# Patient Record
Sex: FEMALE
[De-identification: names, ages, dates, MRNs, and addresses within clinical notes are randomized per-mention and may not be internally consistent; named-entity substitution may affect disease eponyms.]

---

## 2020-02-12 ENCOUNTER — HOSPITAL ENCOUNTER (EMERGENCY)
Dept: HOSPITAL 97 - ER | Age: 35
Discharge: HOME | End: 2020-02-12
Payer: SELF-PAY

## 2020-02-12 DIAGNOSIS — E11.65: Primary | ICD-10-CM

## 2020-02-12 DIAGNOSIS — F43.8: ICD-10-CM

## 2020-02-12 LAB
BUN BLD-MCNC: 10 MG/DL (ref 7–18)
GLUCOSE SERPLBLD-MCNC: 254 MG/DL (ref 74–106)
HCT VFR BLD CALC: 38.9 % (ref 36–45)
LYMPHOCYTES # SPEC AUTO: 3.2 K/UL (ref 0.7–4.9)
PMV BLD: 10.3 FL (ref 7.6–11.3)
POTASSIUM SERPL-SCNC: 3.7 MMOL/L (ref 3.5–5.1)
RBC # BLD: 4.59 M/UL (ref 3.86–4.86)

## 2020-02-12 PROCEDURE — 96372 THER/PROPH/DIAG INJ SC/IM: CPT

## 2020-02-12 PROCEDURE — 85025 COMPLETE CBC W/AUTO DIFF WBC: CPT

## 2020-02-12 PROCEDURE — 96361 HYDRATE IV INFUSION ADD-ON: CPT

## 2020-02-12 PROCEDURE — 96375 TX/PRO/DX INJ NEW DRUG ADDON: CPT

## 2020-02-12 PROCEDURE — 36415 COLL VENOUS BLD VENIPUNCTURE: CPT

## 2020-02-12 PROCEDURE — 81025 URINE PREGNANCY TEST: CPT

## 2020-02-12 PROCEDURE — 81003 URINALYSIS AUTO W/O SCOPE: CPT

## 2020-02-12 PROCEDURE — 82947 ASSAY GLUCOSE BLOOD QUANT: CPT

## 2020-02-12 PROCEDURE — 99284 EMERGENCY DEPT VISIT MOD MDM: CPT

## 2020-02-12 PROCEDURE — 80048 BASIC METABOLIC PNL TOTAL CA: CPT

## 2020-02-12 PROCEDURE — 96374 THER/PROPH/DIAG INJ IV PUSH: CPT

## 2020-02-12 NOTE — ER
Nurse's Notes                                                                                     

 Formerly [facility]                                                                 

Name: Tori Zaman                                                                                

Age: 34 yrs                                                                                       

Sex: Female                                                                                       

: 1985                                                                                   

MRN: [id number]                                                                                   

Arrival Date: 2020                                                                          

Time: 17:10                                                                                       

Account#: [id number]                                                                            

Bed 7                                                                                             

Private MD:                                                                                       

Diagnosis: Other reactions to severe stress;Hyperglycemia, unspecified                            

                                                                                                  

Presentation:                                                                                     

                                                                                             

17:10 Presenting complaint: EMS states: HYPERGLYCEMIA WITH NAUSEA AND DIZZINESS. Transition   bp  

      of care: patient was not received from another setting of care. Onset of symptoms was       

      2020. Risk Assessment: Do you want to hurt yourself or someone else?           

      Patient reports no desire to harm self or others. Initial Sepsis Screen: Does the           

      patient meet any 2 criteria? No. Patient's initial sepsis screen is negative. Does the      

      patient have a suspected source of infection? No. Patient's initial sepsis screen is        

      negative. Care prior to arrival: Medication(s) given: Normal saline infusion, 1000 mL,      

      IV initiated. 20 GA, in the left antecubital area, Glucose check: 308.                      

17:10 Method Of Arrival: EMS: [facility]                                                         bp  

17:10 Acuity: TABITHA 3                                                                           bp  

                                                                                                  

Triage Assessment:                                                                                

17:11 General: Appears in no apparent distress. comfortable, obese, Behavior is calm,         bp  

      cooperative, appropriate for age. Pain: Denies pain. EENT: No deficits noted. Neuro: No     

      deficits noted. Cardiovascular: Rhythm is sinus rhythm. Respiratory: No deficits noted.     

      GI: Reports nausea. : No signs and/or symptoms were reported regarding the                

      genitourinary system. Derm: No deficits noted. Musculoskeletal: No deficits noted.          

                                                                                                  

OB/GYN:                                                                                           

17:11 LMP 2020                                                                           bp  

                                                                                                  

Historical:                                                                                       

- Allergies:                                                                                      

17:11 No Known Allergies;                                                                     bp  

- Home Meds:                                                                                      

17:11 Metformin Oral [Active];                                                                bp  

- PMHx:                                                                                           

17:11 Diabetes - NIDDM;                                                                       bp  

                                                                                                  

- Immunization history:: Adult Immunizations unknown.                                             

- Coronavirus screen:: The patient has NOT traveled to [place] in the past 14 days.                 

  Proceed with normal triage process as indicated. The patient has NOT had contact with           

  known/suspected case of Coronavirus? Proceed with normal triage procedures.                     

- Social history:: Smoking status: Patient denies any tobacco usage or history of.                

- Ebola Screening: : No symptoms or risks identified at this time.                                

                                                                                                  

                                                                                                  

Screenin:13 Abuse screen: Denies threats or abuse. Denies injuries from another. Nutritional        bp  

      screening: No deficits noted. Tuberculosis screening: No symptoms or risk factors           

      identified. Fall Risk None identified.                                                      

                                                                                                  

Assessment:                                                                                       

17:13 General: SEE TRIAGE NOTE.                                                               bp  

18:15 Reassessment: Patient appears in no apparent distress at this time. Patient and/or      hb  

      family updated on plan of care and expected duration. Pain level reassessed. Patient is     

      alert, oriented x 3, equal unlabored respirations, skin warm/dry/pink.                      

19:21 Reassessment: Patient appears in no apparent distress at this time. ROSETTA Dixon talked   rv  

      to the patient and the relatives at the bedside regarding results of diagnostics and        

      the plan of care. patient agreed. patient is for discharge after the infusion of the NS     

      bolus.l.                                                                                    

20:30 Reassessment: Patient appears in no apparent distress at this time. Patient and/or      rv  

      family updated on plan of care and expected duration. Pain level reassessed. Patient is     

      alert, oriented x 3, equal unlabored respirations, skin warm/dry/pink. patient felt         

      comfortable and better after medications and fluid administration. discharged with          

      family member, ambulatory. instructions given and explained.                                

                                                                                                  

Vital Signs:                                                                                      

17:11  / 88; Pulse 89; Resp 17; Temp 97.8; Pulse Ox 100% ; Weight 90.72 kg; Height 5    bp  

      ft. 2 in. (157.48 cm);                                                                      

18:19  / 91; Pulse 87; Resp 16; Pulse Ox 100% on R/A;                                   hb  

19:22  / 76; Pulse 91; Resp 17; Pulse Ox 100% on R/A;                                   rv  

17:11 Body Mass Index 36.58 (90.72 kg, 157.48 cm)                                             bp  

                                                                                                  

ED Course:                                                                                        

17:10 Patient arrived in ED.                                                                  bp  

17:11 Triage completed.                                                                       bp  

17:11 Arm band placed on.                                                                     bp  

17:13 Patient has correct armband on for positive identification. Bed in low position. Call   bp  

      light in reach. Side rails up X2.                                                           

17:13 Maintain EMS IV. Dressing intact. Good blood return noted. Site clean \T\ dry. Gauge \T\    bp

      site: 20 GAUGE LEFT AC.                                                                     

17:14 [facility]                                                        snw 

17:14 Raza Nunes MD is Attending Physician.                                              snw 

17:21 Brennon Mancia, RN is Primary Nurse.                                                    bp  

19:14 Primary Nurse role handed off by Brennon Mancia, RN                                     hb  

19:14 Maranda Read, RN is Primary Nurse.                                                   hb  

19:45 Inserted saline lock: 22 gauge in right hand, using aseptic technique.                  rv  

20:31 No provider procedures requiring assistance completed. IV discontinued, intact,         rv  

      bleeding controlled, No redness/swelling at site. Pressure dressing applied.                

                                                                                                  

Administered Medications:                                                                         

17:32 Drug: Ativan 1 mg Route: IVP; Site: left antecubital;                                   hb  

20:30 Follow up: Response: No adverse reaction                                                rv  

17:32 Drug: NS 0.9% 1000 ml Route: IV; Rate: 1 bolus; Site: left antecubital;                 hb  

20:30 Follow up: IV Status: Completed infusion; IV Intake: 1000ml                             rv  

18:27 Drug: Insulin Regular Human 5 units {Co-Signature: mame (Maranda Read RN).} Route: IVP; bp  

      Site: left antecubital;                                                                     

18:45 Follow up: Response: Blood sugar is lowered                                             bp  

18:29 Drug: Insulin Regular Human 5 units {Co-Signature: mame (Maranda Read RN).} Route:      bp  

      Sub-Q; Site: abdomen;                                                                       

18:44 Follow up: Response: Blood sugar is lowered                                             bp  

                                                                                                  

                                                                                                  

Intake:                                                                                           

20:30 IV: 1000ml; Total: 1000ml.                                                              rv  

                                                                                                  

Outcome:                                                                                          

19:19 Discharge ordered by MD.                                                                snw 

20:31 Discharged to home ambulatory, with family.                                             rv  

20:31 Condition: improved                                                                         

20:31 Discharge instructions given to patient, family, Instructed on discharge instructions,      

      follow up and referral plans. Demonstrated understanding of instructions, follow-up         

      care.                                                                                       

20:32 Patient left the ED.                                                                    rv  

                                                                                                  

Signatures:                                                                                       

Destiny Turpin, FNP-C                 FNP-Csnw                                                  

Maranda Read RN                     RN                                                      

Brennon Mancia, RN                      RN   Jono Paul RN RN   rv                                                   

Maranda vilchis                                                   

                                                                                                  

**************************************************************************************************

## 2020-02-12 NOTE — EDPHYS
Physician Documentation                                                                           

 [facility]                                                                 

Name: Tori Zaman                                                                                

Age: 34 yrs                                                                                       

Sex: Female                                                                                       

: 1985                                                                                   

MRN: [id number]                                                                                   

Arrival Date: 2020                                                                          

Time: 17:10                                                                                       

Account#: [id number]                                                                            

Bed 7                                                                                             

Private MD:                                                                                       

ED Physician Raza Nunes                                                                       

HPI:                                                                                              

                                                                                             

17:25 This 34 yrs old  Female presents to ER via EMS with complaints of High Blood    snw 

      Sugar.                                                                                      

17:25 The patient or guardian reports hyperglycemia, N/V, lightheaded. Onset: The             snw 

      symptoms/episode began/occurred suddenly, at work, co-workers said she became pale and      

      they called EMS. Associated signs and symptoms: Pertinent positives: diaphoresis,           

      nausea, vomiting. Current symptoms: In the emergency department the patient's symptoms      

      have improved, moderately. It is unknown whether or not the patient has had similar         

      symptoms in the past. appt with GUERITA Shipley, Friday. To change Metformin to Victoza and      

      something else as while pt has been stressed the Metformin has not been helping much.       

                                                                                                  

OB/GYN:                                                                                           

17:11 LMP 2020                                                                           bp  

                                                                                                  

Historical:                                                                                       

- Allergies:                                                                                      

17:11 No Known Allergies;                                                                     bp  

- Home Meds:                                                                                      

17:11 Metformin Oral [Active];                                                                bp  

- PMHx:                                                                                           

17:11 Diabetes - NIDDM;                                                                       bp  

                                                                                                  

- Immunization history:: Adult Immunizations unknown.                                             

- Coronavirus screen:: The patient has NOT traveled to [place] in the past 14 days.                 

  Proceed with normal triage process as indicated. The patient has NOT had contact with           

  known/suspected case of Coronavirus? Proceed with normal triage procedures.                     

- Social history:: Smoking status: Patient denies any tobacco usage or history of.                

- Ebola Screening: : No symptoms or risks identified at this time.                                

                                                                                                  

                                                                                                  

ROS:                                                                                              

17:23 Eyes: Negative for injury, pain, redness, and discharge, ENT: Negative for injury,      snw 

      pain, and discharge, Neck: Negative for injury, pain, and swelling, Cardiovascular:         

      Negative for chest pain, palpitations, and edema, Respiratory: Negative for shortness       

      of breath, cough, wheezing, and pleuritic chest pain, Abdomen/GI: Negative for              

      abdominal pain, diarrhea, and constipation, + nausea and vomiting Back: Negative for        

      injury and pain, : Negative for injury, bleeding, discharge, and swelling,                

      MS/Extremity: Negative for injury and deformity, Skin: Negative for injury, rash, and       

      discoloration, Neuro: Negative for headache, weakness, numbness, tingling, and seizure,     

      Psych: Negative for depression, anxiety, suicide ideation, homicidal ideation, and          

      hallucinations.                                                                             

17:23 Constitutional: Positive for malaise, "very stressed".                                      

                                                                                                  

Exam:                                                                                             

17:23 Constitutional:  This is a well developed, well nourished patient who is awake, alert,  snw 

      and in no acute distress. Head/Face:  Normocephalic, atraumatic. Eyes:  Pupils equal        

      round and reactive to light, extra-ocular motions intact.  Lids and lashes normal.          

      Conjunctiva and sclera are non-icteric and not injected.  Cornea within normal limits.      

      Periorbital areas with no swelling, redness, or edema. ENT:  Nares patent. No nasal         

      discharge, no septal abnormalities noted.  Tympanic membranes are normal and external       

      auditory canals are clear.  Oropharynx with no redness, swelling, or masses, exudates,      

      or evidence of obstruction, uvula midline.  Mucous membranes moist. Neck:  Trachea          

      midline, no thyromegaly or masses palpated, and no cervical lymphadenopathy.  Supple,       

      full range of motion without nuchal rigidity, or vertebral point tenderness.  No            

      Meningismus. Chest/axilla:  Normal chest wall appearance and motion.  Nontender with no     

      deformity.  No lesions are appreciated. Cardiovascular:  Regular rate and rhythm with a     

      normal S1 and S2.  No gallops, murmurs, or rubs.  Normal PMI, no JVD.  No pulse             

      deficits. Respiratory:  Lungs have equal breath sounds bilaterally, clear to                

      auscultation and percussion.  No rales, rhonchi or wheezes noted.  No increased work of     

      breathing, no retractions or nasal flaring. Abdomen/GI:  Soft, non-tender, with normal      

      bowel sounds.  No distension or tympany.  No guarding or rebound.  No evidence of           

      tenderness throughout. Back:  No spinal tenderness.  No costovertebral tenderness.          

      Full range of motion. Skin:  Warm, dry with normal turgor.  Normal color with no            

      rashes, no lesions, and no evidence of cellulitis. MS/ Extremity:  Pulses equal, no         

      cyanosis.  Neurovascular intact.  Full, normal range of motion. Neuro:  Awake and           

      alert, GCS 15, oriented to person, place, time, and situation.  Cranial nerves II-XII       

      grossly intact.  Motor strength 5/5 in all extremities.  Sensory grossly intact.            

      Cerebellar exam normal.  Normal gait. Psych:  Awake, alert, with orientation to person,     

      place and time.  Behavior, mood, and affect are within normal limits.                       

                                                                                                  

Vital Signs:                                                                                      

17:11  / 88; Pulse 89; Resp 17; Temp 97.8; Pulse Ox 100% ; Weight 90.72 kg; Height 5    bp  

      ft. 2 in. (157.48 cm);                                                                      

18:19  / 91; Pulse 87; Resp 16; Pulse Ox 100% on R/A;                                   hb  

19:22  / 76; Pulse 91; Resp 17; Pulse Ox 100% on R/A;                                   rv  

17:11 Body Mass Index 36.58 (90.72 kg, 157.48 cm)                                             bp  

                                                                                                  

MDM:                                                                                              

17:15 Patient medically screened.                                                             snw 

19:31 Data reviewed: vital signs, nurses notes. Data interpreted: Pulse oximetry: on room air snw 

      is 100 %. Interpretation: normal. Counseling: I had a detailed discussion with the          

      patient and/or guardian regarding: the historical points, exam findings, and any            

      diagnostic results supporting the discharge/admit diagnosis, lab results, the need for      

      outpatient follow up, to return to the emergency department if symptoms worsen or           

      persist or if there are any questions or concerns that arise at home. Special               

      discussion: Based on the history and exam findings, there is no indication for further      

      emergent testing or inpatient evaluation. I discussed with the patient/guardian the         

      need to see the primary care provider for further evaluation of the symptoms.               

                                                                                                  

                                                                                             

17:19 Order name: CBC with Diff                                                               [facility] 

                                                                                             

17:19 Order name: Chem 7                                                                      [facility] 

                                                                                             

17:19 Order name: Urine Culture                                                               [facility] 

                                                                                             

17:19 Order name: Urine Microscopic Only                                                      [facility] 

                                                                                             

17:42 Order name: CBC with Automated Diff; Complete Time: 17:43                               EDMS

                                                                                             

17:54 Order name: Basic Metabolic Panel; Complete Time: 18:03                                 EDMS

                                                                                             

18:57 Order name: Glucose, Ancillary Testing; Complete Time: 18:59                            EDMS

                                                                                             

19:10 Order name: Urine Dipstick--Ancillary (enter results)                                   mt  

                                                                                             

19:10 Order name: Urine Pregnancy--Ancillary (enter results)                                  mt  

                                                                                             

19:16 Order name: Urine Pregnancy--Ancillary; Complete Time: 19:17                            EDMS

                                                                                             

19:16 Order name: Urine Dipstick-Ancillary; Complete Time: 19:17                              EDMS

                                                                                             

17:19 Order name: Urine Pregnancy Test (obtain specimen); Complete Time: 19:07                snw 

                                                                                             

17:19 Order name: Urine Dipstick-Ancillary (obtain specimen); Complete Time: 19:07            snw 

12                                                                                             

18:32 Order name: FSBS; Complete Time: 18:44                                                  snw 

                                                                                                  

Administered Medications:                                                                         

17:32 Drug: Ativan 1 mg Route: IVP; Site: left antecubital;                                   hb  

20:30 Follow up: Response: No adverse reaction                                                rv  

17:32 Drug: NS 0.9% 1000 ml Route: IV; Rate: 1 bolus; Site: left antecubital;                 hb  

20:30 Follow up: IV Status: Completed infusion; IV Intake: 1000ml                             rv  

18:27 Drug: Insulin Regular Human 5 units {Co-Signature: mame (Maranda Read RN).} Route: IVP; bp  

      Site: left antecubital;                                                                     

18:45 Follow up: Response: Blood sugar is lowered                                             bp  

18:29 Drug: Insulin Regular Human 5 units {Co-Signature: mame (Maranda Read RN).} Route:      bp  

      Sub-Q; Site: abdomen;                                                                       

18:44 Follow up: Response: Blood sugar is lowered                                             bp  

                                                                                                  

                                                                                                  

Disposition:                                                                                      

                                                                                             

07:07 Co-signature as Attending Physician, Raza Nunes MD I agree with the assessment and   kdr 

      plan of care.                                                                               

                                                                                                  

Disposition:                                                                                      

20 19:19 Discharged to Home. Impression: Other reactions to severe stress,                  

  Hyperglycemia, unspecified.                                                                     

- Condition is Stable.                                                                            

- Discharge Instructions: Diabetes and Sick Day Management, Hyperglycemia, Blood                  

  Glucose Monitoring, Adult, Stress and Stress Management.                                        

                                                                                                  

- Work release form, Medication Reconciliation Form, Thank You Letter, Antibiotic                 

  Education, Prescription Opioid Use form.                                                        

- Follow up: Emergency Department; When: As needed; Reason: Worsening of condition.               

  Follow up: Private Physician; When: 1 - 2 days; Reason: Recheck today's complaints,             

  Continuance of care, Re-evaluation by your physician.                                           

                                                                                                  

                                                                                                  

                                                                                                  

Signatures:                                                                                       

Dispatcher [facility] Center[facility]aza Templeton MD MD kdr Therrien, Shelly, FNP-C                 FNP-Csnw                                                  

Maranda Read RN RN hb Peltier, Brian, RN                      RN   bp Gaines, Jono, RN                    RN                                                      

Maranda Read RN                                                                               

                                                                                                  

Corrections: (The following items were deleted from the chart)                                    

                                                                                             

20:32 19:19 2020 19:19 Discharged to Home. Impression: Other reactions to severe        rv  

      stress; Hyperglycemia, unspecified. Condition is Stable. Forms are Medication               

      Reconciliation Form, Thank You Letter, Antibiotic Education, Prescription Opioid Use.       

      Follow up: Emergency Department; When: As needed; Reason: Worsening of condition.           

      Follow up: Private Physician; When: 1 - 2 days; Reason: Recheck today's complaints,         

      Continuance of care, Re-evaluation by your physician. snw                                   

                                                                                                  

**************************************************************************************************

## 2020-02-14 VITALS — OXYGEN SATURATION: 100 %

## 2020-02-14 VITALS — SYSTOLIC BLOOD PRESSURE: 107 MMHG | DIASTOLIC BLOOD PRESSURE: 76 MMHG
